# Patient Record
Sex: MALE | Race: BLACK OR AFRICAN AMERICAN | Employment: FULL TIME | URBAN - METROPOLITAN AREA
[De-identification: names, ages, dates, MRNs, and addresses within clinical notes are randomized per-mention and may not be internally consistent; named-entity substitution may affect disease eponyms.]

---

## 2020-01-24 ENCOUNTER — HOSPITAL ENCOUNTER (EMERGENCY)
Age: 20
Discharge: HOME | End: 2020-01-24
Attending: EMERGENCY MEDICINE
Payer: COMMERCIAL

## 2020-01-24 VITALS
BODY MASS INDEX: 23.03 KG/M2 | HEART RATE: 82 BPM | RESPIRATION RATE: 16 BRPM | DIASTOLIC BLOOD PRESSURE: 72 MMHG | SYSTOLIC BLOOD PRESSURE: 128 MMHG | HEIGHT: 72 IN | WEIGHT: 170 LBS | TEMPERATURE: 98.5 F | OXYGEN SATURATION: 100 %

## 2020-01-24 DIAGNOSIS — R51.9 ACUTE NONINTRACTABLE HEADACHE, UNSPECIFIED HEADACHE TYPE: ICD-10-CM
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
## 2020-01-24 PROCEDURE — 99282 EMERGENCY DEPT VISIT SF MDM: CPT

## 2020-01-24 NOTE — LETTER
[facility] EMERGENCY DEPT 
[address] 
[address] [facility] [address] [phone] Work/School Note Date: 1/24/2020 To Whom It May concern: 
 
Jefry Suarez was seen and treated today in the emergency room by the following provider(s): 
Attending Provider: Mari Tan MD 
Physician Assistant: Corina Hampton PA-C. Jefry Suarez may return to work on 1/25/2020. Sincerely, Carlos Cates PA-C
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
## 2020-01-24 NOTE — DISCHARGE INSTRUCTIONS
